# Patient Record
Sex: FEMALE | Race: WHITE | NOT HISPANIC OR LATINO | Employment: OTHER | URBAN - METROPOLITAN AREA
[De-identification: names, ages, dates, MRNs, and addresses within clinical notes are randomized per-mention and may not be internally consistent; named-entity substitution may affect disease eponyms.]

---

## 2018-09-19 ENCOUNTER — OFFICE VISIT (OUTPATIENT)
Dept: URGENT CARE | Facility: CLINIC | Age: 83
End: 2018-09-19
Payer: COMMERCIAL

## 2018-09-19 VITALS
BODY MASS INDEX: 21.79 KG/M2 | HEIGHT: 60 IN | HEART RATE: 75 BPM | OXYGEN SATURATION: 98 % | SYSTOLIC BLOOD PRESSURE: 152 MMHG | RESPIRATION RATE: 18 BRPM | DIASTOLIC BLOOD PRESSURE: 83 MMHG | TEMPERATURE: 98.3 F | WEIGHT: 111 LBS

## 2018-09-19 DIAGNOSIS — S09.90XA INJURY OF HEAD, INITIAL ENCOUNTER: Primary | ICD-10-CM

## 2018-09-19 DIAGNOSIS — S60.519A ABRASION, HAND W/O INFECTION: ICD-10-CM

## 2018-09-19 DIAGNOSIS — S00.81XA ABRASION, FACE W/O INFECTION: ICD-10-CM

## 2018-09-19 DIAGNOSIS — S80.212A ABRASION, KNEE, LEFT, INITIAL ENCOUNTER: ICD-10-CM

## 2018-09-19 PROCEDURE — 99203 OFFICE O/P NEW LOW 30 MIN: CPT | Performed by: EMERGENCY MEDICINE

## 2018-09-19 RX ORDER — CEPHALEXIN 250 MG/1
500 CAPSULE ORAL EVERY 8 HOURS SCHEDULED
Qty: 15 CAPSULE | Refills: 0 | Status: SHIPPED | OUTPATIENT
Start: 2018-09-19 | End: 2018-09-24

## 2018-09-19 RX ORDER — PHENOL 1.4 %
600 AEROSOL, SPRAY (ML) MUCOUS MEMBRANE 2 TIMES DAILY WITH MEALS
COMMUNITY

## 2018-09-19 RX ORDER — LEVOTHYROXINE SODIUM 0.07 MG/1
75 TABLET ORAL DAILY
COMMUNITY

## 2018-09-19 RX ORDER — VITAMIN E 268 MG
400 CAPSULE ORAL DAILY
COMMUNITY

## 2018-09-19 RX ORDER — ATENOLOL 25 MG/1
12.5 TABLET ORAL DAILY
COMMUNITY

## 2018-09-19 RX ORDER — AMLODIPINE BESYLATE 5 MG/1
5 TABLET ORAL DAILY
COMMUNITY

## 2018-09-19 RX ORDER — SIMVASTATIN 10 MG
10 TABLET ORAL
COMMUNITY

## 2018-09-19 RX ORDER — ALBUTEROL SULFATE 90 UG/1
2 AEROSOL, METERED RESPIRATORY (INHALATION) EVERY 6 HOURS PRN
COMMUNITY

## 2018-09-19 RX ORDER — ASPIRIN 81 MG/1
81 TABLET ORAL DAILY
COMMUNITY

## 2018-09-19 NOTE — PATIENT INSTRUCTIONS
Head Injury   AMBULATORY CARE:   A head injury  is most often caused by a blow to the head  This may occur from a fall, bicycle injury, sports injury, being struck in the head, or a motor vehicle accident  Signs and symptoms: You may have an open wound, swelling, or bruising on your head  Right after the injury, you may be confused  Symptoms may last anywhere from a few hours to a few weeks  You may have any of the following:  · Mild to moderate headache    · Dizziness or loss of balance    · Nausea or vomiting    · Change in mood, such as feeling restless or irritable    · Trouble thinking, remembering, or concentrating    · Ringing in the ears or neck pain    · Drowsiness or decreased amount of energy    · Trouble sleeping  Call 911 or have someone else call for any of the following:   · You cannot be woken  · You have a seizure  · You stop responding to others or you faint  · You have blurry or double vision  · Your speech becomes slurred or confused  · You have arm or leg weakness, loss of feeling, or new problems with coordination  · Your pupils are larger than usual or one pupil is a different size than the other  · You have blood or clear fluid coming out of your ears or nose  Seek care immediately if:   · You have repeated or forceful vomiting  · You feel confused  · Your headache gets worse or becomes severe  · You or someone caring for you notices that you are harder to wake than usual   Contact your healthcare provider if:   · Your symptoms last longer than 6 weeks after the injury  · You have questions or concerns about your condition or care  Medicines:   · Acetaminophen  decreases pain  Acetaminophen is available without a doctor's order  Ask how much to take and how often to take it  Follow directions  Acetaminophen can cause liver damage if not taken correctly  · Take your medicine as directed    Contact your healthcare provider if you think your medicine is not helping or if you have side effects  Tell him or her if you are allergic to any medicine  Keep a list of the medicines, vitamins, and herbs you take  Include the amounts, and when and why you take them  Bring the list or the pill bottles to follow-up visits  Carry your medicine list with you in case of an emergency  Self-care:   · Rest  or do quiet activities for 24 to 48 hours  Limit your time watching TV, using the computer, or doing tasks that require a lot of thinking  Slowly return to your normal activities as directed  Do not play sports or do activities that may cause you to get hit in the head  Ask your healthcare provider when you can return to sports  · Apply ice  on your head for 15 to 20 minutes every hour or as directed  Use an ice pack, or put crushed ice in a plastic bag  Cover it with a towel before you apply it to your skin  Ice helps prevent tissue damage and decreases swelling and pain  · Have someone stay with you for 24 hours  or as directed  This person can monitor you for complications and call [phone]  When you are awake the person should ask you a few questions to see if you are thinking clearly  An example would be to ask your name or your address  Prevent another head injury:   · Wear a helmet that fits properly  Do this when you play sports, or ride a bike, scooter, or skateboard  Helmets help decrease your risk of a serious head injury  Talk to your healthcare provider about other ways you can protect yourself if you play sports  · Wear your seat belt every time you are in a car  This helps to decrease your risk for a head injury if you are in a car accident  Follow up with your healthcare provider as directed:  Write down your questions so you remember to ask them during your visits  © 2017 [employer] Information is for End User's use only and may not be sold, redistributed or otherwise used for commercial purposes   All illustrations and images included in [facility] are the copyrighted property of [employer]  or [employer]  The above information is an  only  It is not intended as medical advice for individual conditions or treatments  Talk to your doctor, nurse or pharmacist before following any medical regimen to see if it is safe and effective for you  Abrasion   AMBULATORY CARE:   An abrasion  is a scrape on your skin  It happens when your skin rubs against a rough surface  Some examples of an abrasion include rug burn, a skinned elbow, or road rash  Abrasions can be many shapes and sizes  The wound may hurt, bleed, bruise, or swell  Seek care immediately if:   · The bleeding does not stop after 10 minutes of firm pressure  · You cannot rinse one or more foreign objects out of your wound  · You have red streaks on your skin coming from your wound  Contact your healthcare provider if:   · You have a fever or chills  · Your abrasion is red, warm, swollen, or draining pus  · You have questions or concerns about your condition or care  Care for your abrasion:   · Wash your hands and dry them with a clean towel  · Press a clean cloth against your wound to stop any bleeding  · Rinse your wound with a lot of clean water  Do not use harsh soap, alcohol, or iodine solutions  · Use a clean, wet cloth to remove any objects, such as small pieces of rocks or dirt  · Rub antibiotic ointment on your wound  This may help prevent infection and help your wound heal     · Cover the wound with a non-stick bandage  Change the bandage daily, and if gets wet or dirty  Follow up with your healthcare provider as directed:  Write down your questions so you remember to ask them during your visits  © 2017 [employer] Information is for End User's use only and may not be sold, redistributed or otherwise used for commercial purposes   All illustrations and images included in CareNotes® are the copyrighted property of [employer]  or [employer]  The above information is an  only  It is not intended as medical advice for individual conditions or treatments  Talk to your doctor, nurse or pharmacist before following any medical regimen to see if it is safe and effective for you  Concussion   WHAT YOU NEED TO KNOW:   What is a concussion? A concussion is a mild brain injury  It is usually caused by a bump or blow to the head from a fall, a motor vehicle crash, or a sports injury  Being shaken forcefully may also cause a concussion  What are the signs and symptoms of a concussion? Symptoms may occur right away, or they may appear days after the concussion  After the injury, you may have any of these symptoms:  · A mild to moderate headache    · Dizziness, loss of balance, or blurry vision    · Nausea or vomiting     · A change in mood, such as restlessness or irritability    · Trouble thinking, remembering things, or concentrating    · Ringing in the ears    · Drowsiness or decreased energy    · Changes in your normal sleeping pattern  How is a concussion diagnosed? Your healthcare provider will ask how you were injured, and about your symptoms  He will also examine you  You may need any of the following:  · A neurologic exam  is also called neuro signs, neuro checks, or neuro status  A neurologic exam can show healthcare providers how well your brain works after your injury  Healthcare providers will check how your pupils (black dots in the center of each eye) react to light  They may check your memory and how easily you wake up  Your hand grasp and balance may also be tested  · A CT, or MRI  of your head may be done  You may be given contrast liquid to help the pictures show up better  Tell the healthcare provider if you have ever had an allergic reaction to contrast liquid  Do not enter the MRI room with anything metal  Metal can cause serious injury   Tell the healthcare provider if you have any metal in or on your body  How is a concussion managed? Usually no treatment is needed for a mild concussion  Concussion symptoms usually go away within about 10 days  The following may be recommended to manage your symptoms:  · Rest  from physical and mental activities as directed  Mental activities are those that require thinking, concentration, and attention  You will need to rest until your symptoms are gone  Rest will allow you to recover from your concussion  Ask your healthcare provider when you can return to work and other daily activities  · Have someone stay with you for the first 24 hours after your injury  Your healthcare provider should be contacted if your symptoms get worse, or you develop new symptoms  · Do not participate in sports and physical activities until your healthcare provider says it is okay  They could make your symptoms worse or lead to another concussion  Your healthcare provider will tell you when it is okay for you to return to sports or physical activities  · Acetaminophen  may help to decrease headache pain  It is available without a doctor's order  Ask how much to take and how often to take it  Follow directions  Acetaminophen can cause liver damage if not taken correctly  · NSAIDs , such as ibuprofen, help decrease swelling and pain  NSAIDs can cause stomach bleeding or kidney problems in certain people  If you take blood thinner medicine, always ask your healthcare provider if NSAIDs are safe for you  Always read the medicine label and follow directions  How can I help prevent another concussion? · Wear protective sports equipment that fit properly  Helmets help decrease your risk of a serious brain injury  Talk to your healthcare provider about ways you can decrease your risk for a concussion if you play sports  · Wear your seat belt  every time you travel   This helps to decrease your risk for a head injury if you are in a car accident  Have someone else call 911 for the following:   · Someone tries to wake you and cannot do so  · You have a seizure, increasing confusion, or a change in personality  · Your speech becomes slurred, or you have new vision problems  When should I seek immediate care? · You have a severe headache that does not go away  · You have arm or leg weakness, numbness, or new problems with coordination  · You have blood or clear fluid coming out of the ears or nose  When should I contact my healthcare provider? · You have nausea or are vomiting  · You feel more sleepy than usual     · Your symptoms get worse  · Your symptoms last longer than 6 weeks after the injury  · You have questions or concerns about your condition or care  CARE AGREEMENT:   You have the right to help plan your care  Learn about your health condition and how it may be treated  Discuss treatment options with your caregivers to decide what care you want to receive  You always have the right to refuse treatment  The above information is an  only  It is not intended as medical advice for individual conditions or treatments  Talk to your doctor, nurse or pharmacist before following any medical regimen to see if it is safe and effective for you  © 2017 [employer] Information is for End User's use only and may not be sold, redistributed or otherwise used for commercial purposes  All illustrations and images included in CareNotes® are the copyrighted property of [employer]  or [employer]oi

## 2018-09-19 NOTE — PROGRESS NOTES
[facility]        NAME: Laurel Diallo is a 80 y o  female  : 1935    MRN: [id number]  DATE: 2018  TIME: 11:51 AM    Assessment and Plan   Injury of head, initial encounter [S09 90XA]  1  Injury of head, initial encounter     2  Abrasion, face w/o infection  cephalexin (KEFLEX) 250 mg capsule   3  Abrasion, knee, left, initial encounter  cephalexin (KEFLEX) 250 mg capsule   4  Abrasion, hand w/o infection  cephalexin (KEFLEX) 250 mg capsule         Patient Instructions     Patient Instructions   Head Injury   AMBULATORY CARE:   A head injury  is most often caused by a blow to the head  This may occur from a fall, bicycle injury, sports injury, being struck in the head, or a motor vehicle accident  Signs and symptoms: You may have an open wound, swelling, or bruising on your head  Right after the injury, you may be confused  Symptoms may last anywhere from a few hours to a few weeks  You may have any of the following:  · Mild to moderate headache    · Dizziness or loss of balance    · Nausea or vomiting    · Change in mood, such as feeling restless or irritable    · Trouble thinking, remembering, or concentrating    · Ringing in the ears or neck pain    · Drowsiness or decreased amount of energy    · Trouble sleeping  Call 911 or have someone else call for any of the following:   · You cannot be woken  · You have a seizure  · You stop responding to others or you faint  · You have blurry or double vision  · Your speech becomes slurred or confused  · You have arm or leg weakness, loss of feeling, or new problems with coordination  · Your pupils are larger than usual or one pupil is a different size than the other  · You have blood or clear fluid coming out of your ears or nose  Seek care immediately if:   · You have repeated or forceful vomiting  · You feel confused  · Your headache gets worse or becomes severe      · You or someone caring for you notices that you are harder to wake than usual   Contact your healthcare provider if:   · Your symptoms last longer than 6 weeks after the injury  · You have questions or concerns about your condition or care  Medicines:   · Acetaminophen  decreases pain  Acetaminophen is available without a doctor's order  Ask how much to take and how often to take it  Follow directions  Acetaminophen can cause liver damage if not taken correctly  · Take your medicine as directed  Contact your healthcare provider if you think your medicine is not helping or if you have side effects  Tell him or her if you are allergic to any medicine  Keep a list of the medicines, vitamins, and herbs you take  Include the amounts, and when and why you take them  Bring the list or the pill bottles to follow-up visits  Carry your medicine list with you in case of an emergency  Self-care:   · Rest  or do quiet activities for 24 to 48 hours  Limit your time watching TV, using the computer, or doing tasks that require a lot of thinking  Slowly return to your normal activities as directed  Do not play sports or do activities that may cause you to get hit in the head  Ask your healthcare provider when you can return to sports  · Apply ice  on your head for 15 to 20 minutes every hour or as directed  Use an ice pack, or put crushed ice in a plastic bag  Cover it with a towel before you apply it to your skin  Ice helps prevent tissue damage and decreases swelling and pain  · Have someone stay with you for 24 hours  or as directed  This person can monitor you for complications and call [phone]  When you are awake the person should ask you a few questions to see if you are thinking clearly  An example would be to ask your name or your address  Prevent another head injury:   · Wear a helmet that fits properly  Do this when you play sports, or ride a bike, scooter, or skateboard  Helmets help decrease your risk of a serious head injury   Talk to your healthcare provider about other ways you can protect yourself if you play sports  · Wear your seat belt every time you are in a car  This helps to decrease your risk for a head injury if you are in a car accident  Follow up with your healthcare provider as directed:  Write down your questions so you remember to ask them during your visits  © 2017 [employer] Information is for End User's use only and may not be sold, redistributed or otherwise used for commercial purposes  All illustrations and images included in CareNotes® are the copyrighted property of A D A M [employer]  or [employer]oi  The above information is an  only  It is not intended as medical advice for individual conditions or treatments  Talk to your doctor, nurse or pharmacist before following any medical regimen to see if it is safe and effective for you  Abrasion   AMBULATORY CARE:   An abrasion  is a scrape on your skin  It happens when your skin rubs against a rough surface  Some examples of an abrasion include rug burn, a skinned elbow, or road rash  Abrasions can be many shapes and sizes  The wound may hurt, bleed, bruise, or swell  Seek care immediately if:   · The bleeding does not stop after 10 minutes of firm pressure  · You cannot rinse one or more foreign objects out of your wound  · You have red streaks on your skin coming from your wound  Contact your healthcare provider if:   · You have a fever or chills  · Your abrasion is red, warm, swollen, or draining pus  · You have questions or concerns about your condition or care  Care for your abrasion:   · Wash your hands and dry them with a clean towel  · Press a clean cloth against your wound to stop any bleeding  · Rinse your wound with a lot of clean water  Do not use harsh soap, alcohol, or iodine solutions  · Use a clean, wet cloth to remove any objects, such as small pieces of rocks or dirt      · Rub antibiotic ointment on your wound  This may help prevent infection and help your wound heal     · Cover the wound with a non-stick bandage  Change the bandage daily, and if gets wet or dirty  Follow up with your healthcare provider as directed:  Write down your questions so you remember to ask them during your visits  © 2017 [address] [employer] Information is for End User's use only and may not be sold, redistributed or otherwise used for commercial purposes  All illustrations and images included in CareNotes® are the copyrighted property of A D A M [employer]  or [employer]  The above information is an  only  It is not intended as medical advice for individual conditions or treatments  Talk to your doctor, nurse or pharmacist before following any medical regimen to see if it is safe and effective for you  Concussion   WHAT YOU NEED TO KNOW:   What is a concussion? A concussion is a mild brain injury  It is usually caused by a bump or blow to the head from a fall, a motor vehicle crash, or a sports injury  Being shaken forcefully may also cause a concussion  What are the signs and symptoms of a concussion? Symptoms may occur right away, or they may appear days after the concussion  After the injury, you may have any of these symptoms:  · A mild to moderate headache    · Dizziness, loss of balance, or blurry vision    · Nausea or vomiting     · A change in mood, such as restlessness or irritability    · Trouble thinking, remembering things, or concentrating    · Ringing in the ears    · Drowsiness or decreased energy    · Changes in your normal sleeping pattern  How is a concussion diagnosed? Your healthcare provider will ask how you were injured, and about your symptoms  He will also examine you  You may need any of the following:  · A neurologic exam  is also called neuro signs, neuro checks, or neuro status   A neurologic exam can show healthcare providers how well your brain works after your injury  Healthcare providers will check how your pupils (black dots in the center of each eye) react to light  They may check your memory and how easily you wake up  Your hand grasp and balance may also be tested  · A CT, or MRI  of your head may be done  You may be given contrast liquid to help the pictures show up better  Tell the healthcare provider if you have ever had an allergic reaction to contrast liquid  Do not enter the MRI room with anything metal  Metal can cause serious injury  Tell the healthcare provider if you have any metal in or on your body  How is a concussion managed? Usually no treatment is needed for a mild concussion  Concussion symptoms usually go away within about 10 days  The following may be recommended to manage your symptoms:  · Rest  from physical and mental activities as directed  Mental activities are those that require thinking, concentration, and attention  You will need to rest until your symptoms are gone  Rest will allow you to recover from your concussion  Ask your healthcare provider when you can return to work and other daily activities  · Have someone stay with you for the first 24 hours after your injury  Your healthcare provider should be contacted if your symptoms get worse, or you develop new symptoms  · Do not participate in sports and physical activities until your healthcare provider says it is okay  They could make your symptoms worse or lead to another concussion  Your healthcare provider will tell you when it is okay for you to return to sports or physical activities  · Acetaminophen  may help to decrease headache pain  It is available without a doctor's order  Ask how much to take and how often to take it  Follow directions  Acetaminophen can cause liver damage if not taken correctly  · NSAIDs , such as ibuprofen, help decrease swelling and pain  NSAIDs can cause stomach bleeding or kidney problems in certain people   If you take blood thinner medicine, always ask your healthcare provider if NSAIDs are safe for you  Always read the medicine label and follow directions  How can I help prevent another concussion? · Wear protective sports equipment that fit properly  Helmets help decrease your risk of a serious brain injury  Talk to your healthcare provider about ways you can decrease your risk for a concussion if you play sports  · Wear your seat belt  every time you travel  This helps to decrease your risk for a head injury if you are in a car accident  Have someone else call 911 for the following:   · Someone tries to wake you and cannot do so  · You have a seizure, increasing confusion, or a change in personality  · Your speech becomes slurred, or you have new vision problems  When should I seek immediate care? · You have a severe headache that does not go away  · You have arm or leg weakness, numbness, or new problems with coordination  · You have blood or clear fluid coming out of the ears or nose  When should I contact my healthcare provider? · You have nausea or are vomiting  · You feel more sleepy than usual     · Your symptoms get worse  · Your symptoms last longer than 6 weeks after the injury  · You have questions or concerns about your condition or care  CARE AGREEMENT:   You have the right to help plan your care  Learn about your health condition and how it may be treated  Discuss treatment options with your caregivers to decide what care you want to receive  You always have the right to refuse treatment  The above information is an  only  It is not intended as medical advice for individual conditions or treatments  Talk to your doctor, nurse or pharmacist before following any medical regimen to see if it is safe and effective for you    © 2017 [employer] Information is for End User's use only and may not be sold, redistributed or otherwise used for commercial purposes  All illustrations and images included in CareNotes® are the copyrighted property of A D A M , Inc  or [employer] Choi  Follow up with PCP in 3-5 days  Proceed to  ER if symptoms worsen  Chief Complaint     Chief Complaint   Patient presents with    Fall     carrying bags and tripped over a grate  abrasions left knee and palm  contusion left 5th digit  abrasion with swelling nose and abrasion forehead         History of Present Illness       Patient complains of multiple wounds from trip and fall onto macadam 1 hour ago  She denies loss of consciousness  She denies nausea vomiting  She admits to a mild bifrontal non throbbing headache  Review of Systems   Review of Systems   Constitutional: Negative for activity change  HENT: Negative for nosebleeds  Eyes: Negative for visual disturbance  Respiratory: Negative for shortness of breath  Cardiovascular: Negative for chest pain  Gastrointestinal: Negative for abdominal pain  Musculoskeletal: Positive for arthralgias and joint swelling  Negative for back pain, myalgias, neck pain and neck stiffness  Skin: Negative for color change and wound  Neurological: Positive for headaches  Negative for dizziness, syncope, speech difficulty, weakness, light-headedness and numbness           Current Medications       Current Outpatient Prescriptions:     albuterol (PROVENTIL HFA,VENTOLIN HFA) 90 mcg/act inhaler, Inhale 2 puffs every 6 (six) hours as needed for wheezing, Disp: , Rfl:     amLODIPine (NORVASC) 5 mg tablet, Take 5 mg by mouth daily, Disp: , Rfl:     aspirin (ECOTRIN LOW STRENGTH) 81 mg EC tablet, Take 81 mg by mouth daily, Disp: , Rfl:     atenolol (TENORMIN) 25 mg tablet, Take 12 5 mg by mouth daily, Disp: , Rfl:     calcium carbonate (OS-ALIX) 600 MG tablet, Take 600 mg by mouth 2 (two) times a day with meals, Disp: , Rfl:     fluticasone-salmeterol (ADVAIR) 250-50 mcg/dose inhaler, Inhale 1 puff 2 (two) times a day Rinse mouth after use , Disp: , Rfl:     levothyroxine 75 mcg tablet, Take 75 mcg by mouth daily, Disp: , Rfl:     Multiple Vitamins-Minerals (ONE-A-DAY 50 PLUS PO), Take 1 capsule by mouth daily, Disp: , Rfl:     simvastatin (ZOCOR) 10 mg tablet, Take 10 mg by mouth daily at bedtime, Disp: , Rfl:     vitamin E, tocopherol, 400 units capsule, Take 400 Units by mouth daily, Disp: , Rfl:     cephalexin (KEFLEX) 250 mg capsule, Take 2 capsules (500 mg total) by mouth every 8 (eight) hours for 5 days, Disp: 15 capsule, Rfl: 0    Current Allergies     Allergies as of 09/19/2018 - Reviewed 09/19/2018   Allergen Reaction Noted    Quinapril Swelling 09/19/2018    Sulfa antibiotics Hives 09/19/2018            The following portions of the patient's history were reviewed and updated as appropriate: allergies, current medications, past family history, past medical history, past social history, past surgical history and problem list      Past Medical History:   Diagnosis Date    Asthma     Disease of thyroid gland     High cholesterol     Hypertension        History reviewed  No pertinent surgical history  No family history on file  Medications have been verified  Objective   /83   Pulse 75   Temp 98 3 °F (36 8 °C) (Oral)   Resp 18   Ht 5' (1 524 m)   Wt 50 3 kg (111 lb)   SpO2 98%   BMI 21 68 kg/m²        Physical Exam     Physical Exam   Constitutional: She is oriented to person, place, and time  She appears well-developed and well-nourished  HENT:   Head: Normocephalic and atraumatic  No crepitus at nasal bones, no bleeding from nasal cavities  Eyes: Conjunctivae and EOM are normal  Pupils are equal, round, and reactive to light  Neck: Normal range of motion  Neck supple  Cardiovascular: Normal rate and regular rhythm  Pulmonary/Chest: Effort normal and breath sounds normal    Musculoskeletal: She exhibits tenderness     Full range of motion knees, wrists, hands, fingers, hips and neck   Neurological: She is alert and oriented to person, place, and time  Skin: Skin is warm and dry  No rash noted  Superficial abrasions, clean, left knee, left palm and bridge of nose  Psychiatric: She has a normal mood and affect  Her behavior is normal  Judgment and thought content normal    Nursing note and vitals reviewed

## 2020-01-15 ENCOUNTER — OFFICE VISIT (OUTPATIENT)
Dept: URGENT CARE | Facility: CLINIC | Age: 85
End: 2020-01-15
Payer: COMMERCIAL

## 2020-01-15 VITALS
OXYGEN SATURATION: 98 % | RESPIRATION RATE: 18 BRPM | WEIGHT: 103 LBS | SYSTOLIC BLOOD PRESSURE: 153 MMHG | DIASTOLIC BLOOD PRESSURE: 76 MMHG | BODY MASS INDEX: 20.76 KG/M2 | HEART RATE: 86 BPM | HEIGHT: 59 IN | TEMPERATURE: 97 F

## 2020-01-15 DIAGNOSIS — H65.02 NON-RECURRENT ACUTE SEROUS OTITIS MEDIA OF LEFT EAR: Primary | ICD-10-CM

## 2020-01-15 PROCEDURE — 99213 OFFICE O/P EST LOW 20 MIN: CPT | Performed by: EMERGENCY MEDICINE

## 2020-01-15 RX ORDER — PREDNISONE 10 MG/1
TABLET ORAL
Qty: 19 TABLET | Refills: 0 | Status: SHIPPED | OUTPATIENT
Start: 2020-01-15

## 2020-01-15 NOTE — PROGRESS NOTES
[facility]        NAME: Felisha Grandchild is a 80 y o  female  : 1935    MRN: [id number]  DATE: January 15, 2020  TIME: 1:34 PM    Assessment and Plan   Non-recurrent acute serous otitis media of left ear [H65 02]  1  Non-recurrent acute serous otitis media of left ear  predniSONE 10 mg tablet         Patient Instructions     Patient Instructions     Serous Otitis Media   WHAT YOU NEED TO KNOW:   Serous otitis media is fluid trapped behind your tympanic membrane (eardrum), without an ear infection  Your eardrum is in your middle ear  Serous otitis media is also called otitis media with effusion  You may have fluid in your ear for months, but it usually goes away on its own  The fluid may be in one or both ears  The fluid may cause muffled sounds, and you may feel like your ears are full  Serous otitis media may be caused by an upper respiratory infection or allergies  It is most common in the fall and early spring  DISCHARGE INSTRUCTIONS:   Return to the emergency department if:   · You have a fever  · You have a sudden loss of hearing in your affected ear  · You develop a severe headache and stiff neck  · You have a seizure  Contact your healthcare provider if:   · You have fluid draining from your ear  · You have new symptoms  · You have questions or concerns about your condition or care  Follow up with your healthcare provider as directed: Your ears will need to be checked regularly  You may need to see a specialist  Write down your questions so you remember to ask them during your visits  ©  2600 [facility] Information is for End User's use only and may not be sold, redistributed or otherwise used for commercial purposes  All illustrations and images included in CareNotes® are the copyrighted property of [email or web address]  or [employer] Choi  The above information is an  only   It is not intended as medical advice for individual conditions or treatments  Talk to your doctor, nurse or pharmacist before following any medical regimen to see if it is safe and effective for you  Follow up with PCP in 3-5 days  Proceed to  ER if symptoms worsen  Chief Complaint     Chief Complaint   Patient presents with    Earache     pain in left ear radiating to left side of jaw         History of Present Illness       Patient complains of left ear pain for the past few days  She denies any change in hearing and uses hearing aids bilaterally  Review of Systems   Review of Systems   Constitutional: Negative for chills and fever  HENT: Positive for ear pain  Negative for ear discharge, rhinorrhea, sinus pressure, sore throat, tinnitus, trouble swallowing and voice change  Respiratory: Negative for cough, chest tightness, shortness of breath and wheezing  Neurological: Negative for dizziness and light-headedness           Current Medications       Current Outpatient Medications:     albuterol (PROVENTIL HFA,VENTOLIN HFA) 90 mcg/act inhaler, Inhale 2 puffs every 6 (six) hours as needed for wheezing, Disp: , Rfl:     amLODIPine (NORVASC) 5 mg tablet, Take 5 mg by mouth daily, Disp: , Rfl:     aspirin (ECOTRIN LOW STRENGTH) 81 mg EC tablet, Take 81 mg by mouth daily, Disp: , Rfl:     atenolol (TENORMIN) 25 mg tablet, Take 12 5 mg by mouth daily, Disp: , Rfl:     calcium carbonate (OS-ALIX) 600 MG tablet, Take 600 mg by mouth 2 (two) times a day with meals, Disp: , Rfl:     Denosumab (PROLIA SC), Inject under the skin, Disp: , Rfl:     fluticasone-salmeterol (ADVAIR) 250-50 mcg/dose inhaler, Inhale 1 puff 2 (two) times a day Rinse mouth after use , Disp: , Rfl:     levothyroxine 75 mcg tablet, Take 75 mcg by mouth daily, Disp: , Rfl:     Multiple Vitamins-Minerals (ONE-A-DAY 50 PLUS PO), Take 1 capsule by mouth daily, Disp: , Rfl:     simvastatin (ZOCOR) 10 mg tablet, Take 10 mg by mouth daily at bedtime, Disp: , Rfl:     vitamin E, tocopherol, 400 units capsule, Take 400 Units by mouth daily, Disp: , Rfl:     predniSONE 10 mg tablet, Take once daily all days pills on this schedule 4 -4 -3 -3 -2 -2 -1, Disp: 19 tablet, Rfl: 0    Current Allergies     Allergies as of 01/15/2020 - Reviewed 01/15/2020   Allergen Reaction Noted    Quinapril Swelling 09/19/2018    Sulfa antibiotics Hives 09/19/2018            The following portions of the patient's history were reviewed and updated as appropriate: allergies, current medications, past family history, past medical history, past social history, past surgical history and problem list      Past Medical History:   Diagnosis Date    Asthma     Disease of thyroid gland     High cholesterol     Hypertension        History reviewed  No pertinent surgical history  Family History   Problem Relation Age of Onset    Heart disease Father          Medications have been verified  Objective   /76   Pulse 86   Temp (!) 97 °F (36 1 °C) (Tympanic)   Resp 18   Ht 4' 11" (1 499 m)   Wt 46 7 kg (103 lb)   SpO2 98%   BMI 20 80 kg/m²        Physical Exam     Physical Exam   Constitutional: She is oriented to person, place, and time  She appears well-developed and well-nourished  No distress  HENT:   Head: Normocephalic and atraumatic  Right Ear: Ear canal normal  No drainage  A middle ear effusion is present  Left Ear: Ear canal normal  No drainage  A middle ear effusion is present  Nose: Mucosal edema present  Mouth/Throat: Mucous membranes are normal  No oropharyngeal exudate, posterior oropharyngeal erythema or tonsillar abscesses  Clear effusion behind left TM, no erythema of TMs  Neck: Neck supple  Lymphadenopathy:     She has no cervical adenopathy  Neurological: She is alert and oriented to person, place, and time  Skin: Skin is warm and dry  Psychiatric: She has a normal mood and affect   Her behavior is normal  Judgment and thought content normal    Nursing note and vitals reviewed

## 2020-01-15 NOTE — PATIENT INSTRUCTIONS
Serous Otitis Media   WHAT YOU NEED TO KNOW:   Serous otitis media is fluid trapped behind your tympanic membrane (eardrum), without an ear infection  Your eardrum is in your middle ear  Serous otitis media is also called otitis media with effusion  You may have fluid in your ear for months, but it usually goes away on its own  The fluid may be in one or both ears  The fluid may cause muffled sounds, and you may feel like your ears are full  Serous otitis media may be caused by an upper respiratory infection or allergies  It is most common in the fall and early spring  DISCHARGE INSTRUCTIONS:   Return to the emergency department if:   · You have a fever  · You have a sudden loss of hearing in your affected ear  · You develop a severe headache and stiff neck  · You have a seizure  Contact your healthcare provider if:   · You have fluid draining from your ear  · You have new symptoms  · You have questions or concerns about your condition or care  Follow up with your healthcare provider as directed: Your ears will need to be checked regularly  You may need to see a specialist  Write down your questions so you remember to ask them during your visits  © 2017 [address] [facility] Information is for End User's use only and may not be sold, redistributed or otherwise used for commercial purposes  All illustrations and images included in CareNotes® are the copyrighted property of A D A M , Inc  or [employer]  The above information is an  only  It is not intended as medical advice for individual conditions or treatments  Talk to your doctor, nurse or pharmacist before following any medical regimen to see if it is safe and effective for you

## 2021-05-15 ENCOUNTER — OFFICE VISIT (OUTPATIENT)
Dept: URGENT CARE | Facility: CLINIC | Age: 86
End: 2021-05-15
Payer: COMMERCIAL

## 2021-05-15 VITALS
TEMPERATURE: 97.3 F | BODY MASS INDEX: 20.56 KG/M2 | RESPIRATION RATE: 18 BRPM | HEIGHT: 59 IN | WEIGHT: 102 LBS | OXYGEN SATURATION: 99 % | SYSTOLIC BLOOD PRESSURE: 141 MMHG | DIASTOLIC BLOOD PRESSURE: 96 MMHG | HEART RATE: 86 BPM

## 2021-05-15 DIAGNOSIS — H65.04 RECURRENT ACUTE SEROUS OTITIS MEDIA OF RIGHT EAR: Primary | ICD-10-CM

## 2021-05-15 PROCEDURE — 99213 OFFICE O/P EST LOW 20 MIN: CPT | Performed by: NURSE PRACTITIONER

## 2021-05-15 RX ORDER — METHYLPREDNISOLONE 4 MG/1
TABLET ORAL
Qty: 1 EACH | Refills: 0 | Status: SHIPPED | OUTPATIENT
Start: 2021-05-15

## 2021-05-15 NOTE — PATIENT INSTRUCTIONS
Serous Otitis Media   WHAT YOU NEED TO KNOW:   Serous otitis media is fluid trapped behind your tympanic membrane (eardrum), without an ear infection  Your eardrum is in your middle ear  Serous otitis media is also called otitis media with effusion  You may have fluid in your ear for months, but it usually goes away on its own  The fluid may be in one or both ears  The fluid may cause muffled sounds, and you may feel like your ears are full  Serous otitis media may be caused by an upper respiratory infection or allergies  It is most common in the fall and early spring  DISCHARGE INSTRUCTIONS:   Return to the emergency department if:   · You have a fever  · You have a sudden loss of hearing in your affected ear  · You develop a severe headache and stiff neck  · You have a seizure  Contact your healthcare provider if:   · You have fluid draining from your ear  · You have new symptoms  · You have questions or concerns about your condition or care  Follow up with your healthcare provider as directed: Your ears will need to be checked regularly  You may need to see a specialist  Write down your questions so you remember to ask them during your visits  © Copyright [address] Information is for End User's use only and may not be sold, redistributed or otherwise used for commercial purposes  All illustrations and images included in CareNotes® are the copyrighted property of A D A M , Inc  or [facility] Bobbi Maxwell   The above information is an  only  It is not intended as medical advice for individual conditions or treatments  Talk to your doctor, nurse or pharmacist before following any medical regimen to see if it is safe and effective for you

## 2021-05-15 NOTE — PROGRESS NOTES
[facility]        NAME: Jolanta Nieto is a 80 y o  female  : 1935    MRN: [id number]  DATE: May 15, 2021  TIME: 9:58 AM    Assessment and Plan   Recurrent acute serous otitis media of right ear [H65 04]  1  Recurrent acute serous otitis media of right ear  methylPREDNISolone 4 MG tablet therapy pack     Start medrol dose pack   Has worked in the past    No need for antibiotics at this time   Pt in agreement with plan of care   Continue f/u with pcp     Patient Instructions     Follow up with PCP in 3-5 days  Proceed to  ER if symptoms worsen  Chief Complaint     Chief Complaint   Patient presents with    Earache     Right sided started about 3 days ago, leading into the left ear         History of Present Illness   Jolanta Nieto presents to the clinic c/o    Pt states recently started to get recurrent ear infections  First one was about a year ago   Does not know why it keeps happening  Does wear hearing aids  Has seen a specialist for it  Last one was in October and seen by pcp   Currently with right ear pain that goes into throat   Started with similar sensation on left side  Review of Systems   Review of Systems   All other systems reviewed and are negative  Current Medications     Long-Term Medications   Medication Sig Dispense Refill    amLODIPine (NORVASC) 5 mg tablet Take 5 mg by mouth daily      aspirin (ECOTRIN LOW STRENGTH) 81 mg EC tablet Take 81 mg by mouth daily      atenolol (TENORMIN) 25 mg tablet Take 12 5 mg by mouth daily      calcium carbonate (OS-ALIX) 600 MG tablet Take 600 mg by mouth 2 (two) times a day with meals      fluticasone-salmeterol (ADVAIR) 250-50 mcg/dose inhaler Inhale 1 puff 2 (two) times a day Rinse mouth after use        levothyroxine 75 mcg tablet Take 75 mcg by mouth daily      simvastatin (ZOCOR) 10 mg tablet Take 10 mg by mouth daily at bedtime      vitamin E, tocopherol, 400 units capsule Take 400 Units by mouth daily Current Allergies     Allergies as of 05/15/2021 - Reviewed 05/15/2021   Allergen Reaction Noted    Quinapril Swelling 09/19/2018    Sulfa antibiotics Hives 09/19/2018            The following portions of the patient's history were reviewed and updated as appropriate: allergies, current medications, past family history, past medical history, past social history, past surgical history and problem list     Objective   /96   Pulse 86   Temp (!) 97 3 °F (36 3 °C)   Resp 18   Ht 4' 11" (1 499 m)   Wt 46 3 kg (102 lb)   SpO2 99%   BMI 20 60 kg/m²        Physical Exam     Physical Exam  Vitals signs and nursing note reviewed  Constitutional:       Appearance: Normal appearance  She is well-developed  HENT:      Head: Normocephalic and atraumatic  Right Ear: Hearing, ear canal and external ear normal  A middle ear effusion is present  Tympanic membrane is not injected or erythematous  Left Ear: Hearing, ear canal and external ear normal  A middle ear effusion is present  Tympanic membrane is not injected or erythematous  Mouth/Throat:      Pharynx: Oropharynx is clear  Eyes:      General: Lids are normal       Conjunctiva/sclera: Conjunctivae normal    Cardiovascular:      Rate and Rhythm: Normal rate and regular rhythm  Heart sounds: Normal heart sounds, S1 normal and S2 normal    Pulmonary:      Effort: Pulmonary effort is normal       Breath sounds: Normal breath sounds  Skin:     General: Skin is warm and dry  Neurological:      Mental Status: She is alert and oriented to person, place, and time  Psychiatric:         Speech: Speech normal          Behavior: Behavior normal  Behavior is cooperative  Thought Content:  Thought content normal          Judgment: Judgment normal

## 2021-09-16 ENCOUNTER — OFFICE VISIT (OUTPATIENT)
Dept: URGENT CARE | Facility: CLINIC | Age: 86
End: 2021-09-16
Payer: COMMERCIAL

## 2021-09-16 VITALS
HEIGHT: 60 IN | OXYGEN SATURATION: 97 % | HEART RATE: 78 BPM | TEMPERATURE: 98 F | WEIGHT: 100 LBS | BODY MASS INDEX: 19.63 KG/M2 | SYSTOLIC BLOOD PRESSURE: 143 MMHG | DIASTOLIC BLOOD PRESSURE: 79 MMHG | RESPIRATION RATE: 18 BRPM

## 2021-09-16 DIAGNOSIS — M25.551 RIGHT HIP PAIN: Primary | ICD-10-CM

## 2021-09-16 PROCEDURE — 99213 OFFICE O/P EST LOW 20 MIN: CPT | Performed by: PHYSICIAN ASSISTANT

## 2021-09-16 RX ORDER — KETOROLAC TROMETHAMINE 30 MG/ML
15 INJECTION, SOLUTION INTRAMUSCULAR; INTRAVENOUS ONCE
Status: COMPLETED | OUTPATIENT
Start: 2021-09-16 | End: 2021-09-16

## 2021-09-16 RX ADMIN — KETOROLAC TROMETHAMINE 15 MG: 30 INJECTION, SOLUTION INTRAMUSCULAR; INTRAVENOUS at 09:15

## 2021-09-16 NOTE — PATIENT INSTRUCTIONS
Hip Bursitis   WHAT YOU NEED TO KNOW:   Hip bursitis is inflammation of the bursa in your hip  The bursa is a fluid-filled sac that acts as a cushion between a bone and a tendon  A tendon is a cord of strong tissue that connects muscles to bones  DISCHARGE INSTRUCTIONS:   Call your doctor if:   · Your pain and swelling increase  · Your symptoms do not improve with treatment  · You have a fever  · You have questions or concerns about your condition or care  Medicines: You may need any of the following:  · NSAIDs , such as ibuprofen, help decrease swelling, pain, and fever  NSAIDs can cause stomach bleeding or kidney problems in certain people  If you take blood thinner medicine, always ask your healthcare provider if NSAIDs are safe for you  Always read the medicine label and follow directions  · Aspirin  helps relieve pain and swelling  Take aspirin exactly as directed by your healthcare provider  · Antibiotics  help fight an infection caused by bacteria       · Steroid  pills may be given for a short time to relieve pain and swelling  · Take your medicine as directed  Contact your healthcare provider if you think your medicine is not helping or if you have side effects  Tell him of her if you are allergic to any medicine  Keep a list of the medicines, vitamins, and herbs you take  Include the amounts, and when and why you take them  Bring the list or the pill bottles to follow-up visits  Carry your medicine list with you in case of an emergency  Manage hip bursitis:   · Rest your hip as much as possible to decrease pain and swelling  Slowly start to do more each day  Return to your daily activities as directed  You may be able to use a cane or other device to take pressure off the hip as you walk  · Apply ice to help decrease swelling and pain  Use an ice pack, or put crushed ice in a plastic bag  Cover the bag with a towel before you place it on your elbow   Apply ice for 15 to 20 minutes, 3 to 4 times each day, as directed  · Do not lie on your injured hip  You may be more comfortable if you sleep on your stomach or back  · Go to physical therapy, if directed  A physical therapist teaches you exercises to help improve movement and strength, and to decrease pain  Prevent hip bursitis:   · Do not overuse your hips  Shorten the time you spend running, climbing stairs, or riding a bike  Take breaks as you do these activities  Try not to do the same activities each day  For example, run every other day or every 3 days instead of daily  · Stretch, warm up, and cool down  Always stretch and do warmup and cool-down exercises before and after you exercise  This will help loosen your muscles and decrease stress on your hip  Rest between workouts  · Wear proper shoes  Wear shoes that fit properly and support your feet  You may need to wear shoe inserts called orthotics  Orthotics help position your foot correctly as you walk or exercise  · Maintain a healthy weight  Ask your healthcare provider what a healthy weight is for you  Ask him or her to help you create a weight loss plan if you are overweight  · Keep pressure off your hips  Do not stand or sit for long periods of time  Sit on padded surfaces, such as a cushion or pad, whenever possible  Bend your knees when you  objects from the ground  Follow up with your doctor as directed:  Write down your questions so you remember to ask them during your visits  © Copyright [employer] 2021 Information is for End User's use only and may not be sold, redistributed or otherwise used for commercial purposes  All illustrations and images included in CareNotes® are the copyrighted property of A [employer] A M , Inc  or Carlos Maxwell   The above information is an  only  It is not intended as medical advice for individual conditions or treatments   Talk to your doctor, nurse or pharmacist before following any medical regimen to see if it is safe and effective for you

## 2021-09-23 ENCOUNTER — OFFICE VISIT (OUTPATIENT)
Dept: OBGYN CLINIC | Facility: CLINIC | Age: 86
End: 2021-09-23
Payer: COMMERCIAL

## 2021-09-23 VITALS
DIASTOLIC BLOOD PRESSURE: 60 MMHG | TEMPERATURE: 97.6 F | SYSTOLIC BLOOD PRESSURE: 100 MMHG | HEART RATE: 58 BPM | WEIGHT: 100 LBS | BODY MASS INDEX: 20.16 KG/M2 | HEIGHT: 59 IN

## 2021-09-23 DIAGNOSIS — M25.551 ACUTE RIGHT HIP PAIN: ICD-10-CM

## 2021-09-23 DIAGNOSIS — M25.551 GREATER TROCHANTERIC PAIN SYNDROME OF RIGHT LOWER EXTREMITY: Primary | ICD-10-CM

## 2021-09-23 PROCEDURE — 99203 OFFICE O/P NEW LOW 30 MIN: CPT | Performed by: STUDENT IN AN ORGANIZED HEALTH CARE EDUCATION/TRAINING PROGRAM

## 2021-09-23 PROCEDURE — 20610 DRAIN/INJ JOINT/BURSA W/O US: CPT | Performed by: STUDENT IN AN ORGANIZED HEALTH CARE EDUCATION/TRAINING PROGRAM

## 2021-09-23 RX ORDER — LIDOCAINE HYDROCHLORIDE 20 MG/ML
4 INJECTION, SOLUTION INFILTRATION; PERINEURAL
Status: COMPLETED | OUTPATIENT
Start: 2021-09-23 | End: 2021-09-23

## 2021-09-23 RX ORDER — TRIAMCINOLONE ACETONIDE 40 MG/ML
40 INJECTION, SUSPENSION INTRA-ARTICULAR; INTRAMUSCULAR
Status: COMPLETED | OUTPATIENT
Start: 2021-09-23 | End: 2021-09-23

## 2021-09-23 RX ADMIN — TRIAMCINOLONE ACETONIDE 40 MG: 40 INJECTION, SUSPENSION INTRA-ARTICULAR; INTRAMUSCULAR at 09:27

## 2021-09-23 RX ADMIN — LIDOCAINE HYDROCHLORIDE 4 ML: 20 INJECTION, SOLUTION INFILTRATION; PERINEURAL at 09:27

## 2021-09-23 NOTE — PROGRESS NOTES
1  Greater trochanteric pain syndrome of right lower extremity  Large joint arthrocentesis: R greater trochanteric bursa   2  Acute right hip pain  Large joint arthrocentesis: R greater trochanteric bursa     Orders Placed This Encounter   Procedures    Large joint arthrocentesis: R greater trochanteric bursa        Imaging Studies (I personally reviewed images in PACS and report):    · X-ray right hip 07/30/2020:  Imaging is via [facility] and I am unable to view the images but report is available noting: There is no evidence of fracture or dislocation  There is mild spurring of the greater trochanter, as well as the symphysis pubis and right ischial tuberosity, mild degenerative change of the left femoral acetabular joint as well  Right hip center edge angle is 42 degrees  IMPRESSION:   Acute right hip pain w/o precipitating injury   Greater trochanteric pain syndrome/bursitis     of note, patient states she is starting formal physical therapy for this issue that is upcoming    Other factors:     Patient reports prior history of similar pain in the past which she was treated for greater trochanteric bursitis  Last cortisone injection was approximately 2 years ago per patient    PLAN:     Clinical exam and radiographic imaging reviewed with patient today, with impression as per above  I have discussed with the patient the pathophysiology of this diagnosis and reviewed how the examination correlates with this diagnosis  Treatment options were discussed at length and after discussing these treatment options, the patient elected to have a right-sided greater trochanteric bursa injection of cortisone as per procedure note below   I reassured patient that she should follow up with physical therapy as well as the injection would only provide pain relief; I counseled that physical therapy can help short and long-term recovery and prevent this issue in the future       I counseled that I can repeat the injection every 3 months as needed if NSAIDs /acetaminophen, icing/ heat therapy did not improve the pain initially  Return in about 4 weeks (around 10/21/2021)  CHIEF COMPLAINT:  Right hip pain    HPI:  Lico Krishna is a 80 y o  female  who presents for       Visit 9/23/2021 :  Initial evaluation of right hip pain: of note, patient states she has a history of right-sided greater trochanteric bursitis in the past and was last given a cortisone injection for this issue 3 years ago  She feels her pain is the same as in the past   She states this new pain started about 1 month ago without a precipitating injury  Pain is located over the lateral aspect of her hip and she describes it as a sharp / aching pain, intermittent, and aggravated with prolonged walking or lying on her right side  She denies any bruising, swelling, numbness/ tingling  She had seeing urgent care for this issue in which he was prescribed NSAIDs but feels that it did not provide much relief  She states the only alleviating factors during is moving her right hip  She states she has upcoming physical therapy for this issue but is interested in potential injection of her hip today if possible  Review of Systems   Constitutional: Negative for chills, fatigue and fever  Respiratory: Negative for cough and shortness of breath  Gastrointestinal: Negative for abdominal pain, nausea and vomiting  Musculoskeletal:        As per HPI   Skin: Negative for rash and wound  Neurological:        As per HPI         Medical, Surgical, Family, and Social History    Past Medical History:   Diagnosis Date    Asthma     Disease of thyroid gland     High cholesterol     Hypertension      History reviewed  No pertinent surgical history    Social History   Social History     Substance and Sexual Activity   Alcohol Use Never     Social History     Substance and Sexual Activity   Drug Use Never     Social History     Tobacco Use   Smoking Status Never Smoker   Smokeless Tobacco Never Used     Family History   Problem Relation Age of Onset    Heart disease Father      Allergies   Allergen Reactions    Nebivolol Other (See Comments)     Fatigue  Fatigue      Quinapril Swelling    Sulfa Antibiotics Hives          Physical Exam  /60   Pulse 58   Temp 97 6 °F (36 4 °C) (Temporal)   Ht 4' 11" (1 499 m)   Wt 45 4 kg (100 lb)   BMI 20 20 kg/m²     Constitutional:  see vital signs  Gen: well-developed, normocephalic/atraumatic, well-groomed  Eyes: No inflammation or discharge of conjunctiva or lids; sclera clear   Pharynx: no inflammation, lesion, or mass of lips  Neck: supple, no masses, non-distended  MSK: no inflammation, lesion, mass, or clubbing of nails and digits except for other than mentioned below  SKIN: no visible rashes or skin lesions  Pulmonary/Chest: Effort normal  No respiratory distress  NEURO: cranial nerves grossly intact  PSYCH:  Alert and oriented to person, place, and time; recent and remote memory intact; mood normal, no depression, anxiety, or agitation, judgment and insight good and intact     Right Hip Exam     Tenderness   The patient is experiencing tenderness in the greater trochanter  Range of Motion   Extension: -20   Flexion: 120   External rotation: 30   Internal rotation: 25     Muscle Strength   Abduction: 4/5   Adduction: 5/5   Flexion: 4/5     Tests   JANICE: negative    Other   Erythema: absent    Comments:  Gait: normal without antalgia  Log roll: negative  SLR: negative              Large joint arthrocentesis: R greater trochanteric bursa  Universal Protocol:  Consent: Verbal consent obtained  Risks and benefits: risks, benefits and alternatives were discussed  Consent given by: patient  Time out: Immediately prior to procedure a "time out" was called to verify the correct patient, procedure, equipment, support staff and site/side marked as required    Timeout called at: 9/23/2021 9:20 AM   Patient understanding: patient states understanding of the procedure being performed  Site marked: the operative site was marked  Radiology Images displayed and confirmed   If images not available, report reviewed: imaging studies available  Patient identity confirmed: verbally with patient    Supporting Documentation  Indications: pain   Procedure Details  Location: hip - R greater trochanteric bursa  Preparation: Patient was prepped and draped in the usual sterile fashion  Needle size: 22 G (3 5 inch spinal needle)  Ultrasound guidance: no  Approach: lateral (perpindicular over greater trochanter at site of maximal tenderness)  Medications administered: 40 mg triamcinolone acetonide 40 mg/mL; 4 mL lidocaine 2 %    Patient tolerance: patient tolerated the procedure well with no immediate complications  Dressing:  Sterile dressing applied

## 2021-09-29 ENCOUNTER — EVALUATION (OUTPATIENT)
Dept: PHYSICAL THERAPY | Facility: CLINIC | Age: 86
End: 2021-09-29
Payer: COMMERCIAL

## 2021-09-29 DIAGNOSIS — M70.61 TROCHANTERIC BURSITIS OF RIGHT HIP: ICD-10-CM

## 2021-09-29 DIAGNOSIS — M25.551 RIGHT HIP PAIN: Primary | ICD-10-CM

## 2021-09-29 PROCEDURE — 97110 THERAPEUTIC EXERCISES: CPT | Performed by: PHYSICAL THERAPIST

## 2021-09-29 PROCEDURE — 97161 PT EVAL LOW COMPLEX 20 MIN: CPT | Performed by: PHYSICAL THERAPIST

## 2021-09-29 NOTE — PROGRESS NOTES
PT Evaluation     Today's date: 2021  Patient name: Sonia Dent  : 1935  MRN: [id number]  Referring provider: Hector Benz, *  Dx:   Encounter Diagnosis     ICD-10-CM    1  Right hip pain  M25 551 Ambulatory referral to Physical Therapy   2  Trochanteric bursitis of right hip  M70 61                   Assessment  Assessment details: Sonia Dent is a 80year old female presenting to PT with chief complaint of R hip pain starting a few weeks ago  Pt reports no trauma or injury to the area, but has noticed an increase in pain without relief  Upon examination, patient was found to have objective deficits as listed below and is displaying ss consistent with trochanteric bursitis of R hip  Pt is currently experiencing functional deficits including pain with ambulation, sleep disturbances and ADL's  Patient would benefit from skilled PT services to address these impairments and to maximize function in order to improve quality of life  Thank you for the referral   Impairments: abnormal or restricted ROM, impaired balance and lacks appropriate home exercise program    Symptom irritability: lowUnderstanding of Dx/Px/POC: excellent  Goals  STG  1) R hip ROM will improve WNL in 2 weeks  2) R hip strength will improve 1/2 grade in 2 weeks  3) Pt will decrease pain levels by 2-3 at its worst in 2 weeks  LTG  1) Patient is independent in HEP  2) Pt will report no sleep disturbances or trouble turning in bed in 4 weeks  3) Pt will report no hip pain in 4 weeks  4) Pt will be able to perform all ADL's without R hip pain or difficulty by DC         Plan  Patient would benefit from: skilled physical therapy  Planned modality interventions: cryotherapy and hydrotherapy  Planned therapy interventions: abdominal trunk stabilization, manual therapy, massage, neuromuscular re-education, balance, patient education, breathing training, strengthening, stretching, therapeutic activities, therapeutic exercise, flexibility, gait training and home exercise program  Frequency: 1x week (Pt has a $35 copay is comfortable coming 1x a week )  Duration in weeks: 4  Treatment plan discussed with: patient        Subjective Evaluation    History of Present Illness  Mechanism of injury: Pt notes that she has been having R hip pain for about a month now  She has had bursitis in her hip before and stated that it felt similar  She did got to Urgent care they took x-rays, negative for any fractures and was referred to ortho  She did see Dr Agatha Moeller last week and he gave her a shot in her hip  Since then, she has had less pain in the hip  She notes that she continues to have some sleep disturbances and difficulty turning in bed  She notes that sitting for periods of time is painful, but has improved  She states that she has been able to stair climb without difficulty  Quality of life: excellent    Pain  Current pain ratin  At best pain ratin  At worst pain ratin  Location: R hip joint, into thigh   Quality: sharp  Relieving factors: heat  Aggravating factors: sitting, standing and walking    Social Support  Steps to enter house: no  Stairs in house: yes   Lives in: apartment  Lives with: alone    Employment status: not working  Hand dominance: left      Diagnostic Tests  X-ray: normal  Treatments  No previous or current treatments  Patient Goals  Patient goals for therapy: decreased pain, increased motion, increased strength, improved balance and independence with ADLs/IADLs          Objective     Tenderness     Right Hip   Tenderness in the greater trochanter  Neurological Testing     Sensation     Hip   Left Hip   Intact: light touch    Right Hip   Intact: light touch    Active Range of Motion   Left Hip   Normal active range of motion    Right Hip   Flexion: 105 degrees   Abduction: 25 degrees   External rotation (90/90): [place]  Internal rotation (90/90):  [facility]    Strength/Myotome Testing     Left Hip Planes of Motion   Flexion: 4+  Abduction: 4+  Adduction: 4+    Right Hip   Planes of Motion   Flexion: 4  Abduction: 4-  Adduction: 4    Tests     Right Hip   Positive JANICE  Negative piriformis and scour  Additional Tests Details  Pinpoint tenderness during palpation on R hip joint     Functional Assessment        Single Leg Stance   Left: 30 (1 HHA) seconds  Right: 30 (2 HHA) seconds       Precautions: HTN, high cholesterol      Daily Treatment Diary:      Initial Evaluation Date: 09/29/21  Compliance 9/29                     Visit Number 1                    Re-Eval  IE                 [facility]   [employer] Y                           9/29                     Manual                      Foam roll ITB                      STM quad  Ther-Ex                      Nu step warm up                      SKC HEP                     Hip fall out -                     Piriformis stretch -                     clamshells HEP                     SLR- flex HEP                     Mini squats -                     HS stretch HEP                     ITB stretch HEP                     Hip add    HEP                                                            Neuro Re-Ed                      Balance- tandem stance -                     SL on AE- EO, EC -                                                   Ther-Act                                                               Modalities                      MH or CP prn

## 2021-10-06 ENCOUNTER — OFFICE VISIT (OUTPATIENT)
Dept: PHYSICAL THERAPY | Facility: CLINIC | Age: 86
End: 2021-10-06
Payer: COMMERCIAL

## 2021-10-06 DIAGNOSIS — M25.551 RIGHT HIP PAIN: Primary | ICD-10-CM

## 2021-10-06 DIAGNOSIS — M70.61 TROCHANTERIC BURSITIS OF RIGHT HIP: ICD-10-CM

## 2021-10-06 PROCEDURE — 97110 THERAPEUTIC EXERCISES: CPT | Performed by: PHYSICAL THERAPIST

## 2021-10-13 ENCOUNTER — OFFICE VISIT (OUTPATIENT)
Dept: PHYSICAL THERAPY | Facility: CLINIC | Age: 86
End: 2021-10-13
Payer: COMMERCIAL

## 2021-10-13 DIAGNOSIS — M25.551 RIGHT HIP PAIN: Primary | ICD-10-CM

## 2021-10-13 DIAGNOSIS — M70.61 TROCHANTERIC BURSITIS OF RIGHT HIP: ICD-10-CM

## 2021-10-13 PROCEDURE — 97110 THERAPEUTIC EXERCISES: CPT | Performed by: PHYSICAL THERAPIST

## 2021-10-13 PROCEDURE — 97112 NEUROMUSCULAR REEDUCATION: CPT | Performed by: PHYSICAL THERAPIST

## 2021-10-20 ENCOUNTER — APPOINTMENT (OUTPATIENT)
Dept: PHYSICAL THERAPY | Facility: CLINIC | Age: 86
End: 2021-10-20
Payer: COMMERCIAL

## 2021-10-27 ENCOUNTER — APPOINTMENT (OUTPATIENT)
Dept: PHYSICAL THERAPY | Facility: CLINIC | Age: 86
End: 2021-10-27
Payer: COMMERCIAL

## 2023-09-22 ENCOUNTER — APPOINTMENT (OUTPATIENT)
Dept: RADIOLOGY | Facility: CLINIC | Age: 88
End: 2023-09-22
Payer: COMMERCIAL

## 2023-09-22 ENCOUNTER — OFFICE VISIT (OUTPATIENT)
Dept: URGENT CARE | Facility: CLINIC | Age: 88
End: 2023-09-22
Payer: COMMERCIAL

## 2023-09-22 VITALS
RESPIRATION RATE: 18 BRPM | OXYGEN SATURATION: 98 % | HEART RATE: 83 BPM | BODY MASS INDEX: 18.55 KG/M2 | TEMPERATURE: 96.7 F | DIASTOLIC BLOOD PRESSURE: 82 MMHG | WEIGHT: 92 LBS | SYSTOLIC BLOOD PRESSURE: 174 MMHG | HEIGHT: 59 IN

## 2023-09-22 DIAGNOSIS — M25.532 LEFT WRIST PAIN: ICD-10-CM

## 2023-09-22 DIAGNOSIS — I80.8 SUPERFICIAL PHLEBITIS OF ARM: Primary | ICD-10-CM

## 2023-09-22 PROCEDURE — 99213 OFFICE O/P EST LOW 20 MIN: CPT

## 2023-09-22 PROCEDURE — 73110 X-RAY EXAM OF WRIST: CPT

## 2023-09-22 RX ORDER — BISOPROLOL FUMARATE 5 MG/1
2.5 TABLET, FILM COATED ORAL DAILY
COMMUNITY
Start: 2023-07-06

## 2023-09-22 RX ORDER — APIXABAN 2.5 MG/1
2.5 TABLET, FILM COATED ORAL 2 TIMES DAILY
COMMUNITY
Start: 2023-07-31

## 2023-09-22 RX ORDER — CEPHALEXIN 500 MG/1
500 CAPSULE ORAL EVERY 8 HOURS SCHEDULED
Qty: 21 CAPSULE | Refills: 0 | Status: SHIPPED | OUTPATIENT
Start: 2023-09-22 | End: 2023-09-29

## 2023-09-22 RX ORDER — FLUTICASONE FUROATE, UMECLIDINIUM BROMIDE AND VILANTEROL TRIFENATATE 100; 62.5; 25 UG/1; UG/1; UG/1
1 POWDER RESPIRATORY (INHALATION) DAILY
COMMUNITY
Start: 2023-08-31

## 2023-09-22 NOTE — PATIENT INSTRUCTIONS
Preliminary XR read negative, final read pending  Take antibiotic as prescribed  Heat/Ice  Rest and Elevation  OTC Tylenol as needed for pain   Follow up with PCP in 3-5 days. Proceed to  ER if symptoms worsen. Phlebitis   AMBULATORY CARE:   Phlebitis  is inflammation of the wall of your vein. Inflammation may be caused by damage to or infection of your vein. Phlebitis may occur in your arm or leg. Symptoms include pain, redness, and swelling near the vein. Symptoms may appear when you are receiving an IV medication, or 48 to 96 hours after you receive the medicine. Seek care immediately if:   Your leg or arm turns pale or blue. Your leg or arm feels hot or cold. Your arm or leg feels warm, tender, and painful. It may look swollen and red. Contact your healthcare provider if:   You have a fever. You have more pain, swelling, or warmth near your vein. Your symptoms do not improve within 72 hours. You have questions or concerns about your condition or care. Treatment for phlebitis  may include medicines to decrease pain and swelling. Your IV catheter will be removed. Follow up with your healthcare provider as directed:  Write down your questions so you remember to ask them during your visits. Manage your symptoms:   Apply a warm compress to your vein. This will help decrease swelling and pain. Wet a washcloth in warm water. Do not  use hot water. Apply the warm compress for 10 minutes. Repeat this 4 times each day. Elevate your leg or arm above the level of your heart as often as you can. This will help decrease swelling and pain. Prop your leg or arm on pillows or blankets to keep it elevated comfortably. Wear pressure stockings if directed. Pressure stockings improve blood flow and help decrease pain and swelling. Pressure stockings can also help decrease your risk for blood clots in your legs. Wear the stockings during the day.  Do not wear them overnight when you sleep. Do not inject illegal drugs. Talk to your healthcare provider if you use IV drugs and need help to quit. © Copyright [employer] 2023 Information is for End User's use only and may not be sold, redistributed or otherwise used for commercial purposes. The above information is an  only. It is not intended as medical advice for individual conditions or treatments. Talk to your doctor, nurse or pharmacist before following any medical regimen to see if it is safe and effective for you.

## 2023-09-22 NOTE — PROGRESS NOTES
[facility]        NAME: Barron Lerma is a 80 y.o. female  : 1935    MRN: [id number]  DATE: 2023  TIME: 12:05 PM    Assessment and Plan   Superficial phlebitis of arm [I80.8]  1. Superficial phlebitis of arm  XR wrist 3+ vw left    cephalexin (KEFLEX) 500 mg capsule        Preliminary x-ray read negative for acute osseous abnormality, final read pending. Skin findings concerning for superficial phlebitis and possible skin infection. Will trial cephalexin and encouraged continued supportive measures. Follow up with PCP in 3-5 days or proceed to emergency department for worsening symptoms. Patient verbalized understanding of instructions given. Patient Instructions     Patient Instructions     Preliminary XR read negative, final read pending  Take antibiotic as prescribed  Heat/Ice  Rest and Elevation  OTC Tylenol as needed for pain   Follow up with PCP in 3-5 days. Proceed to  ER if symptoms worsen. Phlebitis   AMBULATORY CARE:   Phlebitis  is inflammation of the wall of your vein. Inflammation may be caused by damage to or infection of your vein. Phlebitis may occur in your arm or leg. Symptoms include pain, redness, and swelling near the vein. Symptoms may appear when you are receiving an IV medication, or 48 to 96 hours after you receive the medicine. Seek care immediately if:   • Your leg or arm turns pale or blue. • Your leg or arm feels hot or cold. • Your arm or leg feels warm, tender, and painful. It may look swollen and red. Contact your healthcare provider if:   • You have a fever. • You have more pain, swelling, or warmth near your vein. • Your symptoms do not improve within 72 hours. • You have questions or concerns about your condition or care. Treatment for phlebitis  may include medicines to decrease pain and swelling. Your IV catheter will be removed.    Follow up with your healthcare provider as directed:  Write down your questions so you remember to ask them during your visits. Manage your symptoms:   • Apply a warm compress to your vein. This will help decrease swelling and pain. Wet a washcloth in warm water. Do not  use hot water. Apply the warm compress for 10 minutes. Repeat this 4 times each day. • Elevate your leg or arm above the level of your heart as often as you can. This will help decrease swelling and pain. Prop your leg or arm on pillows or blankets to keep it elevated comfortably. • Wear pressure stockings if directed. Pressure stockings improve blood flow and help decrease pain and swelling. Pressure stockings can also help decrease your risk for blood clots in your legs. Wear the stockings during the day. Do not wear them overnight when you sleep. • Do not inject illegal drugs. Talk to your healthcare provider if you use IV drugs and need help to quit. © Copyright [facility] 2023 Information is for End User's use only and may not be sold, redistributed or otherwise used for commercial purposes. The above information is an  only. It is not intended as medical advice for individual conditions or treatments. Talk to your doctor, nurse or pharmacist before following any medical regimen to see if it is safe and effective for you. Chief Complaint     Chief Complaint   Patient presents with   • Wrist Pain     Left wrist started hurting yesterday morning when she woke up. Outer wrist is red and swollen. Uses left hand for cane. History of Present Illness       72-year-old female with a past medical history significant for asthma and hypertension presents with complaints of left wrist pain x2 days. Patient denies any specific known injury or trauma but does normally use her left hand to ambulate with cane. She states that she noticed redness, swelling, and warmth to left wrist.  She denies fever, chills, or flulike symptoms.   Denies recent laboratory studies or IV placement. She has tried using biofreeze as well as a bandage for support. She does take Eliquis. Review of Systems   Review of Systems   Constitutional: Negative for chills and fever. Respiratory: Negative for cough and shortness of breath. Cardiovascular: Negative for chest pain. Gastrointestinal: Negative for abdominal pain, diarrhea, nausea and vomiting. Musculoskeletal: Positive for arthralgias and joint swelling. Skin: Positive for color change. Neurological: Negative for weakness and numbness.          Current Medications       Current Outpatient Medications:   •  albuterol (PROVENTIL HFA,VENTOLIN HFA) 90 mcg/act inhaler, Inhale 2 puffs every 6 (six) hours as needed for wheezing, Disp: , Rfl:   •  amLODIPine (NORVASC) 5 mg tablet, Take 5 mg by mouth daily, Disp: , Rfl:   •  aspirin (ECOTRIN LOW STRENGTH) 81 mg EC tablet, Take 81 mg by mouth daily, Disp: , Rfl:   •  bisoprolol (ZEBETA) 5 mg tablet, Take 2.5 mg by mouth daily, Disp: , Rfl:   •  calcium carbonate (OS-ALIX) 600 MG tablet, Take 600 mg by mouth 2 (two) times a day with meals, Disp: , Rfl:   •  cephalexin (KEFLEX) 500 mg capsule, Take 1 capsule (500 mg total) by mouth every 8 (eight) hours for 7 days, Disp: 21 capsule, Rfl: 0  •  Denosumab (PROLIA SC), Inject under the skin, Disp: , Rfl:   •  Eliquis 2.5 MG, Take 2.5 mg by mouth 2 (two) times a day, Disp: , Rfl:   •  fluticasone-salmeterol (ADVAIR) 250-50 mcg/dose inhaler, Inhale 1 puff 2 (two) times a day Rinse mouth after use., Disp: , Rfl:   •  levothyroxine 75 mcg tablet, Take 75 mcg by mouth daily, Disp: , Rfl:   •  Multiple Vitamins-Minerals (ONE-A-DAY 50 PLUS PO), Take 1 capsule by mouth daily, Disp: , Rfl:   •  simvastatin (ZOCOR) 10 mg tablet, Take 10 mg by mouth daily at bedtime, Disp: , Rfl:   •  Trelegy Ellipta 100-62.5-25 MCG/ACT inhaler, Inhale 1 puff daily, Disp: , Rfl:   •  vitamin E, tocopherol, 400 units capsule, Take 400 Units by mouth daily, Disp: , Rfl:   • atenolol (TENORMIN) 25 mg tablet, Take 12.5 mg by mouth daily (Patient not taking: Reported on 9/22/2023), Disp: , Rfl:   •  methylPREDNISolone 4 MG tablet therapy pack, Use as directed on package (Patient not taking: Reported on 9/16/2021), Disp: 1 each, Rfl: 0  •  predniSONE 10 mg tablet, Take once daily all days pills on this schedule 4 -4 -3 -3 -2 -2 -1 (Patient not taking: Reported on 5/15/2021), Disp: 19 tablet, Rfl: 0    Current Allergies     Allergies as of 09/22/2023 - Reviewed 09/22/2023   Allergen Reaction Noted   • Nebivolol Other (See Comments) 01/14/2013   • Quinapril Swelling 09/19/2018   • Sulfa antibiotics Hives 09/19/2018            The following portions of the patient's history were reviewed and updated as appropriate: allergies, current medications, past family history, past medical history, past social history, past surgical history and problem list.     Past Medical History:   Diagnosis Date   • Asthma    • Disease of thyroid gland    • High cholesterol    • Hypertension        History reviewed. No pertinent surgical history. Family History   Problem Relation Age of Onset   • Heart disease Father          Medications have been verified. Objective   BP (!) 174/82   Pulse 83   Temp (!) 96.7 °F (35.9 °C)   Resp 18   Ht 4' 11" (1.499 m)   Wt 41.7 kg (92 lb)   SpO2 98%   BMI 18.58 kg/m²   No LMP recorded. Patient is postmenopausal.       Physical Exam     Physical Exam  Vitals and nursing note reviewed. Constitutional:       General: She is not in acute distress. Appearance: She is not toxic-appearing. HENT:      Head: Normocephalic. Nose: Nose normal.      Mouth/Throat:      Mouth: Mucous membranes are moist.      Pharynx: Oropharynx is clear. Eyes:      Conjunctiva/sclera: Conjunctivae normal.   Pulmonary:      Effort: Pulmonary effort is normal.   Musculoskeletal:         General: Swelling and tenderness present.       Left forearm: Normal.      Left wrist: Tenderness present. Normal pulse. Left hand: Normal.   Skin:     General: Skin is warm and dry. Findings: Erythema present. Neurological:      Mental Status: She is alert and oriented to person, place, and time. Gait: Gait is intact.       Comments: Ambulates with use of cane   Psychiatric:         Mood and Affect: Mood normal.         Behavior: Behavior normal.

## 2023-12-03 NOTE — PROGRESS NOTES
[facility]        NAME: Albina Davenport is a 80 y o  female  : 1935    MRN: [id number]  DATE: 2021  TIME: 10:24 AM    Assessment and Plan   Right hip pain [M25 551]  1  Right hip pain  Ambulatory referral to Orthopedic Surgery    Ambulatory referral to Physical Therapy    ketorolac (TORADOL) injection 15 mg      bursitis versus arthritis  Patient Instructions     Over-the-counter Tylenol arthritis  Ice  Gentle range of motion stretching  Physical therapy  Orthopedic surgery  Follow up with PCP in 3-5 days  Proceed to  ER if symptoms worsen  Chief Complaint     Chief Complaint   Patient presents with    Hip Pain     right hip pain x 3 weeks, hx bursitis  History of Present Illness       Patient is an 70-year-old female with significant past medical history of hypertension, hyperlipidemia, hypothyroidism, and asthma presents the office complaining of right hip pain for 3 weeks  Pain is located to the lateral aspect described as 8/10 which is worse with use and 1st thing in the morning  Denies any falls, injury, or increased with activity  Patient has history of bursitis in same hip with similar symptoms  She did not take anything for her symptoms because she does not like taking medications  She has been using a cane for ambulation  States she had appointment orthopedic multiple times but states the got canceled  Review of Systems   Review of Systems   Musculoskeletal: Positive for arthralgias  Negative for joint swelling  Neurological: Negative for numbness           Current Medications       Current Outpatient Medications:     albuterol (PROVENTIL HFA,VENTOLIN HFA) 90 mcg/act inhaler, Inhale 2 puffs every 6 (six) hours as needed for wheezing, Disp: , Rfl:     amLODIPine (NORVASC) 5 mg tablet, Take 5 mg by mouth daily, Disp: , Rfl:     aspirin (ECOTRIN LOW STRENGTH) 81 mg EC tablet, Take 81 mg by mouth daily, Disp: , Rfl:     atenolol (TENORMIN) 25 mg Assessment/Plan:    No acute distress or toxicity noted. No rales, pt afebrile- do not suspect pneumonia.  Likely viral illness vs allergies causing asthma exacerbation.   Continue Zyrtec.   Will Rx prednisone. Continue albuterol PRN.    See patient instructions below.  At the end of the encounter, I discussed results, diagnosis, medications. Discussed red flags for immediate return to clinic/ER, as well as indications for follow up if no improvement. Patient understood and agreed to plan. Patient was stable for discharge.      ICD-10-CM    1. Acute cough  R05.1       2. Moderate persistent asthma with exacerbation  J45.41 predniSONE (DELTASONE) 20 MG tablet      3. Chronic rhinitis  J31.0             Return in about 3 days (around 12/6/2023) for Follow up w/ primary care provider if not better.    GENEVIEVE Gallo, [facility]    ------------------------------------------------------------------------------------------------------------------------------------------------------------------------  HPI:  Felisha Gorman is a 65 year old female with hx of asthma & chronic rhinitis who presents for evaluation of cough & rhinorrhea onset 1 week ago. She uses Trelegy for her asthma and Flonase. She has been taking Zyrtec which helps for the first part of the day, but symptoms seem to worsen again in the evening. She is using her albuterol inhaler which helps the cough somewhat. Patient reports no fever/chills, myalgias, nasal congestion, sore throat, loss of sense of taste or smell, headache, chest pain, shortness of breath, abdominal pain, nausea, vomiting, diarrhea, rash, or any other symptoms. No known sick contacts/COVID exposure.     Past Medical History:   Diagnosis Date    Allergy, unspecified not elsewhere classified     Dysthymic disorder     Essential hypertension, benign     flight anxiety     Frequent BRONCHITIS     usually in winter    HSV (herpes simplex virus)  "anogenital infection     Impaired fasting glucose 9/23/2007    Glucose 101 9/07    Left anterior hemiblock     Mild intermittent asthma     URI induced    Obstructive sleep apnea (adult) (pediatric)     not on CPAP; feels rested 2014    Other and unspecified hyperlipidemia     Palpitations     PVC and PVC bigmeny    Rheumatic fever without mention of heart involvement     no sequelae    transient neurologic sx 2005    ? TIA; had MRI with temporal lobe lesion that is getting smaller       Vitals:    12/03/23 1227   BP: 135/83   Pulse: 88   Temp: 98.3  F (36.8  C)   TempSrc: Oral   SpO2: 97%   Weight: 93 kg (205 lb)   Height: 1.753 m (5' 9\")       Physical Exam  Vitals and nursing note reviewed.   HENT:      Nose: Nose normal.      Mouth/Throat:      Mouth: Mucous membranes are moist.      Pharynx: Oropharynx is clear.   Cardiovascular:      Rate and Rhythm: Normal rate and regular rhythm.      Heart sounds: Normal heart sounds.   Pulmonary:      Effort: Pulmonary effort is normal. No accessory muscle usage or respiratory distress.      Breath sounds: Wheezing (diffuse expiratory) present. No rales.   Neurological:      Mental Status: She is alert.         Labs/Imaging:  No results found for this or any previous visit (from the past 24 hour(s)).  No results found for this or any previous visit (from the past 24 hour(s)).      There are no Patient Instructions on file for this visit.  " tablet, Take 12 5 mg by mouth daily, Disp: , Rfl:     calcium carbonate (OS-ALXI) 600 MG tablet, Take 600 mg by mouth 2 (two) times a day with meals, Disp: , Rfl:     Denosumab (PROLIA SC), Inject under the skin, Disp: , Rfl:     fluticasone-salmeterol (ADVAIR) 250-50 mcg/dose inhaler, Inhale 1 puff 2 (two) times a day Rinse mouth after use , Disp: , Rfl:     levothyroxine 75 mcg tablet, Take 75 mcg by mouth daily, Disp: , Rfl:     Multiple Vitamins-Minerals (ONE-A-DAY 50 PLUS PO), Take 1 capsule by mouth daily, Disp: , Rfl:     simvastatin (ZOCOR) 10 mg tablet, Take 10 mg by mouth daily at bedtime, Disp: , Rfl:     vitamin E, tocopherol, 400 units capsule, Take 400 Units by mouth daily, Disp: , Rfl:     methylPREDNISolone 4 MG tablet therapy pack, Use as directed on package (Patient not taking: Reported on 9/16/2021), Disp: 1 each, Rfl: 0    predniSONE 10 mg tablet, Take once daily all days pills on this schedule 4 -4 -3 -3 -2 -2 -1 (Patient not taking: Reported on 5/15/2021), Disp: 19 tablet, Rfl: 0  No current facility-administered medications for this visit  Current Allergies     Allergies as of 09/16/2021 - Reviewed 09/16/2021   Allergen Reaction Noted    Quinapril Swelling 09/19/2018    Sulfa antibiotics Hives 09/19/2018            The following portions of the patient's history were reviewed and updated as appropriate: allergies, current medications, past family history, past medical history, past social history, past surgical history and problem list      Past Medical History:   Diagnosis Date    Asthma     Disease of thyroid gland     High cholesterol     Hypertension        History reviewed  No pertinent surgical history  Family History   Problem Relation Age of Onset    Heart disease Father          Medications have been verified          Objective   /79   Pulse 78   Temp 98 °F (36 7 °C)   Resp 18   Ht 5' (1 524 m)   Wt 45 4 kg (100 lb)   SpO2 97%   BMI 19 53 kg/m²   No LMP recorded  Patient is postmenopausal        Physical Exam     Physical Exam  Vitals and nursing note reviewed  Constitutional:       Appearance: She is well-developed  HENT:      Head: Normocephalic and atraumatic  Right Ear: External ear normal       Left Ear: External ear normal       Nose: Nose normal    Eyes:      General: Lids are normal       Conjunctiva/sclera: Conjunctivae normal    Musculoskeletal:      Right hip: Bony tenderness (Greater trochanter) present  No deformity or lacerations  Normal range of motion  Decreased strength (4/5 hip flexion)  Skin:     General: Skin is warm and dry  Capillary Refill: Capillary refill takes less than 2 seconds  Neurological:      Mental Status: She is alert